# Patient Record
Sex: MALE | Race: WHITE | Employment: OTHER | URBAN - METROPOLITAN AREA
[De-identification: names, ages, dates, MRNs, and addresses within clinical notes are randomized per-mention and may not be internally consistent; named-entity substitution may affect disease eponyms.]

---

## 2022-05-24 NOTE — PROGRESS NOTES
Lucas Palm is a 68 y.o. male here for new patient appt for prostate cancer. Referred by Dr Saloni Dye. Pt here with wife. He is taking Casodex and has received his 1st Lupron injection on 5/6/22.    1. Have you been to the ER, urgent care clinic since your last visit? Hospitalized since your last visit? New Pt    2. Have you seen or consulted any other health care providers outside of the [address] [place] [address] since your last visit? Include any pap smears or colon screening.   New Pt

## 2022-05-25 ENCOUNTER — DOCUMENTATION ONLY (OUTPATIENT)
Dept: ONCOLOGY | Age: 77
End: 2022-05-25

## 2022-05-25 ENCOUNTER — OFFICE VISIT (OUTPATIENT)
Dept: ONCOLOGY | Age: 77
End: 2022-05-25
Payer: MEDICARE

## 2022-05-25 VITALS
OXYGEN SATURATION: 100 % | HEIGHT: 69 IN | SYSTOLIC BLOOD PRESSURE: 146 MMHG | HEART RATE: 99 BPM | TEMPERATURE: 98.3 F | WEIGHT: 168.6 LBS | DIASTOLIC BLOOD PRESSURE: 84 MMHG | BODY MASS INDEX: 24.97 KG/M2

## 2022-05-25 DIAGNOSIS — C79.51 PROSTATE CANCER METASTATIC TO BONE (HCC): Primary | ICD-10-CM

## 2022-05-25 DIAGNOSIS — C61 PROSTATE CANCER METASTATIC TO BONE (HCC): Primary | ICD-10-CM

## 2022-05-25 PROCEDURE — G8432 DEP SCR NOT DOC, RNG: HCPCS | Performed by: INTERNAL MEDICINE

## 2022-05-25 PROCEDURE — 1123F ACP DISCUSS/DSCN MKR DOCD: CPT | Performed by: INTERNAL MEDICINE

## 2022-05-25 PROCEDURE — G0463 HOSPITAL OUTPT CLINIC VISIT: HCPCS | Performed by: INTERNAL MEDICINE

## 2022-05-25 PROCEDURE — G8420 CALC BMI NORM PARAMETERS: HCPCS | Performed by: INTERNAL MEDICINE

## 2022-05-25 PROCEDURE — G8536 NO DOC ELDER MAL SCRN: HCPCS | Performed by: INTERNAL MEDICINE

## 2022-05-25 PROCEDURE — 1101F PT FALLS ASSESS-DOCD LE1/YR: CPT | Performed by: INTERNAL MEDICINE

## 2022-05-25 PROCEDURE — 99205 OFFICE O/P NEW HI 60 MIN: CPT | Performed by: INTERNAL MEDICINE

## 2022-05-25 PROCEDURE — G8427 DOCREV CUR MEDS BY ELIG CLIN: HCPCS | Performed by: INTERNAL MEDICINE

## 2022-05-25 RX ORDER — MULTIVITAMIN
1 TABLET ORAL DAILY
COMMUNITY

## 2022-05-25 RX ORDER — BICALUTAMIDE 50 MG/1
50 TABLET, FILM COATED ORAL DAILY
COMMUNITY

## 2022-05-25 RX ORDER — CHOLECALCIFEROL (VITAMIN D3) 125 MCG
CAPSULE ORAL AS NEEDED
COMMUNITY

## 2022-05-25 NOTE — PROGRESS NOTES
Oncology Social Work  Psychosocial Assessment    Reason for Assessment:      [] Social Work Referral [] Initial Assessment [] New Diagnosis [] Other    Advance Care Planning:  No flowsheet data found. Sources of Information:    []Patient  []Family  []Staff  []Medical Record    Mental Status:    []Alert  []Lethargic  []Unresponsive   [] Unable to assess   Oriented to:  []Person  []Place  []Time  []Situation      Relationship Status:  []Single  []  []Significant Other/Life Partner  []  []  []    Living Circumstances:  []Lives Alone  []Family/Significant Other in Household  []Roommates  []Children in the Home  []Paid Caregivers  []Assisted Living Facility/Group Home  []Skilled [address]  []Homeless  []Incarcerated  []Environmental/Care Concerns  []Other:    Employment Status:  []Employed Full-time []Employed Part-time []Homemaker  [] Retired [] [address] [] [employer]  [] Unemployed   [] SSI   [] SSDI  [] Self-Employment    Barriers to Learning:    []Language  []Developmental  []Cognitive  []Altered Mental Status  []Visual/Hearing Impairment  []Unable to Read/Write  []Motivational  [] Challenges Understanding Medical Jargon []No Barriers Identified      Financial/Legal Concerns:    []Uninsured  []Limited Income/Resources  []Non-Citizen  []Food Insecurity []No Concerns Identified   []Other:    Protestant/Spiritual/Existential:  Does patient have any spiritual or Adventism beliefs? [] Yes [] No  Is the patient involved in a spiritual, pawan or Adventism community?  [] Yes [] No  Patient expressing spiritual/existential angst? [] Yes [] No  Notes:    Support System:    Identified Support Person/Group:  []Strong  []Fair  []Limited    Coping with Illness:   []  Coping Well  [] Challenges Coping with Serious Illness [] Situational Depression [] Situational Anxiety [] Anticipatory Grief  [] Recent Loss [] Caregiver [place]            Narrative:     Met with patient and his wife, Macarena Perez of 47 years today,  to introduce social work navigator role and supports. Pt worked for [employer]) and the [employer]/still consulting regarding nuclear power. Couple lived and raised their son and dtr in [place], [place]. Wife was assistant to [employer] of [employer] and hospital near [facility]. Family is good friend of  Matthias Gross wife. Couple seeking 2nd opinion but feel comfortable with treatment at [facility] and with addting [facility]. No other concerns       Plan:   1. Introduce self and role of the  in the [address] 2. Informed the patient of the [facility] and available resources there. 3. Continue to meet with the patient when he returns to the clinic for ongoing assessment of the patient's adjustment to his diagnosis and treatment. 4. Ongoing psychosocial support as desired by patient    Referral/Handouts:    Complementary therapies referral  Insurance/Entitlements referral  Financial/Medication assistance referral       Sergio Correa.  KENDRA Levine/THERESA  Supervisee in Social Work

## 2022-05-26 ENCOUNTER — TELEPHONE (OUTPATIENT)
Dept: ONCOLOGY | Age: 77
End: 2022-05-26

## 2022-05-26 NOTE — TELEPHONE ENCOUNTER
Pt called just wanted to make sure all info from yesterdays appointment was sent over to Dr. Maame Machuca at [address] [place] [address]

## 2022-05-26 NOTE — TELEPHONE ENCOUNTER
Pt left VM on nurse line at 1240 asking for a call back to send records to Antonio Garsia at [facility].

## 2022-06-13 NOTE — PROGRESS NOTES
[address], [address] [facility], [address] [place] [address]  [phone]      Oncology Note         Patient: Peri Zeng MRN: [id number]  SSN: [id number]    YOB: 1945  Age: 68 y.o. Sex: male      Subjective:      Peri Zeng is a 68 y.o. male who I am seeing for a new diagnosis of hormone sensitive advanced prostate carcinoma. He presented to the [facility] in April 2022 with mild upper epigastric pain for 3-4 weeks and worsening back pain for over 2 weeks. CT showed significant enlarged LNs both above and below the diaphragm as well as widespread bony metastasis. PSA was found to be 1100. A left axillary LN biopsy revealed a diagnosis of adenocarcinoma of the prostate. He started Lupron and is also taking Bicalutamide. He then sought an opinion with Dr. Kaylyn Khan and ultimately comes to me for second medical oncology opinion. Back pain has diminished. Review of Systems:    Constitutional: negative  Eyes: negative  Ears, Nose, Mouth, Throat, and Face: negative  Respiratory: negative  Cardiovascular: negative  Gastrointestinal: negative  Genitourinary:negative  Integument/Breast: negative  Hematologic/Lymphatic: negative  Musculoskeletal: Back pain  Neurological: negative    Past Medical History:   Diagnosis Date    Cancer [facility])     prostate     Past Surgical History:   Procedure Laterality Date    HX ORTHOPAEDIC      surgery for ruptured disk 30 years ago      Family History   Problem Relation Age of Onset    Cancer Other         kidney     Social History     Tobacco Use    Smoking status: Never Smoker    Smokeless tobacco: Never Used   Substance Use Topics    Alcohol use: Yes     Comment: on occasion      Prior to Admission medications    Medication Sig Start Date End Date Taking? Authorizing Provider   bicalutamide (CASODEX) 50 mg tablet Take 50 mg by mouth daily.    Yes Provider, Historical   calcium-cholecalciferol, D3, (CALTRATE 600+D) tablet Take 1 Tablet by mouth daily. Yes Provider, Historical   naproxen sodium (Aleve) 220 mg cap Take  by mouth as needed. Yes Provider, Historical              No Known Allergies        Objective:     Vitals:    05/25/22 0937   BP: (!) 146/84   Pulse: 99   Temp: 98.3 °F (36.8 °C)   TempSrc: Temporal   SpO2: 100%   Weight: 168 lb 9.6 oz (76.5 kg)   Height: 5' 9\" (1.753 m)            Physical Exam:    GENERAL: alert, cooperative, no distress, appears stated age  EYE: conjunctivae/corneas clear. PERRL, EOM's intact  LYMPHATIC: Cervical, supraclavicular, and axillary nodes normal.   THROAT & NECK: normal and no erythema or exudates noted. LUNG: clear to auscultation bilaterally  HEART: regular rate and rhythm, S1, S2 normal, no murmur, click, rub or gallop  ABDOMEN: soft, non-tender. Bowel sounds normal. No masses,  no organomegaly  EXTREMITIES:  extremities normal, atraumatic, no cyanosis or edema  SKIN: Normal.  NEUROLOGIC: AOx3. Gait normal. Reflexes and motor strength normal and symmetric. Cranial nerves 2-12 and sensation grossly intact. Assessment:     1. Metastatic prostate carcinoma:    Adenopathy both above and below the diaphragm   Widespread bone metastasis     ECOG PS 1  Intent of therapy - palliative  Prognosis: Guarded    I had a long discussion about the pathophysiology of hormone sensitive metastatic prostate carcinoma and the various treatment options. I spent 65 minutes in face-to-face encounter with the patient. The primary treatment of metastatic prostate carcinoma is ADT. He has started receiving Lupron. He is also on Bicalutamide. Addition of Enzalutamide has shown to improve rPFS and OS in patients with hormone sensitive metastatic prostate carcinoma.     Enzalutamide is associated with significantly longer progression-free and overall survival than standard care in men with metastatic, hormone-sensitive prostate cancer receiving testosterone suppression. Thomson-Cathie estimates of overall survival at 3 years were 80% (based on 94 events) in the enzalutamide group and 72% (based on 130 events) in the standard-care group. Better results with enzalutamide were also seen in PSA progression-free survival (174 and 333 events, respectively; hazard ratio, 0.39; P<0.001) and in clinical progression-free survival (167 and 320 events, respectively; hazard ratio, 0.40; P<0.001). I also educated him about the possible addition of Docetaxel to Lupron and LEONEL. This triplet combination was investigated in a recently reported clinical trial - ARASENS. Mr. Romy Hickey is not interested in receiving Docetaxel. Plan:       1. Continue LH-RH agonist  2. D/C Bicalutamide  3. Start Enzalutamide  4. F/U with Dr. Vamsi Cline @ [facility] in [place]      Signed By: Temi Huggins MD     June 13, 2022         CC. Saira Mera MD  CC.  Ewa Rodriguez MD

## 2022-06-14 ENCOUNTER — DOCUMENTATION ONLY (OUTPATIENT)
Dept: ONCOLOGY | Age: 77
End: 2022-06-14

## 2022-06-14 NOTE — PROGRESS NOTES
Faxed office note to Dr. Raghavendra Aaron  [phone] at patient's request.  Office number [phone]